# Patient Record
(demographics unavailable — no encounter records)

---

## 2018-07-09 NOTE — CT
CT ABDOMEN AND PELVIS WITH IV AND ORAL CONTRAST:

 

HISTORY:

Abdominal pain.  Abdominal surgery on 05/01/2018.

 

FINDINGS:

Mild scarring at the lung bases.  Small, hyperdense stones within the gallbladder lumen.

 

Postoperative changes of the anterior abdominal wall.  A band of fibrous tissue and a very small amou
nt of fluid extends obliquely, across the anterior aspect of the right upper quadrant.  The resultant
 pouch in the right abdomen contains a small amount of nonobstructed colon, primarily the hepatic fle
xure.  At the left anterior abdominal wall, fluid within the wall and fascial planes is sheath-like a
nd measures up to 1.6 cm in depth.  There is no abnormal enhancing rim.  No gas is apparent within th
e fluid.

 

The appendix is not inflamed.  No evidence of bowel obstruction.  There is severe S-shaped rotatory s
coliotic curvature of the spine.  The urinary bladder is incompletely distended.

 

IMPRESSION:

1.  Postoperative changes of the abdomen, including the anterior abdominal wall.  A small amount of n
oncomplicated fluid remains within the anterior wall.  It does not appear to be infected, based on CT
 characteristics.  Part of the postoperative changes also include a band if fibrous tissue and minima
l fluid across the anterior aspect of the right upper quadrant, resulting in some nonobstructed colon
 within a pouch in the right upper quadrant.  If there is point tenderness to the right upper quadran
t or if the patient were to develop symptoms of bowel obstruction, please consider surgical re-evalua
tion.

 

2.  Cholelithiasis.

 

POS: [facility]

## 2018-08-07 NOTE — MMO
BILATERAL MAMMOGRAMS:

 

DATE:  08/07/18 

 

HISTORY:  

Screening mammography. 

 

COMPARISON:  

None. Baseline study. 

 

FINDINGS:

Scattered fibroglandular densities. No dominant mass or suspicious calcifications. 

 

The study was evaluated with the assistance of computer-aided detection. 

 

IMPRESSION: 

 

BIRADS 1:  Negative

 

Suggest routine follow-up.   

 

POS: [facility]

## 2018-08-09 NOTE — CT
CT CHEST NONCONTRAST:

LOW DOSE PULMONARY LUNG SCAN:

 

HISTORY:

Tobacco abuse.  Dyspnea.

 

FINDINGS:

The lungs are hyperinflated.  No solid or calcified nodules.  No pleural fluid or pneumothorax.  Lesa
re rightward convex rotatory scoliotic curvature of the thoracic spine.

 

Lack of IV contrast limits evaluation of the soft tissues.  Normal branching of the great vessels fro
m the aortic arch.  Arterial calcification.

 

IMPRESSION:

1.  Lung-RADS category 1-Negative.  Suggest routine followup.

 

2.  Chronic obstructive pulmonary disease.

 

3.  Atherosclerosis.

 

4.  Severe rightward convex curvature of the thoracic spine.

 

POS: [facility]

## 2018-11-28 NOTE — CT
CT ABDOMEN AND PELVIS WITH IV CONTRAST:

 

Technique: Multiple axial tomograms were obtained through the abdomen and pelvis with IV enhancement.
 Oral contrast was given. 

 

Indications: Right abdominal pain. Constipation. 

 

Comparison: CT abdomen and pelvis, 7-9-18

 

FINDINGS: 

The lungs bases are clear. Liver, spleen, and pancreas are unremarkable. Stomach and duodenum are unr
emarkable. 

 

There are bilateral adrenal nodules. There are surgical clips adjacent to the right adrenal gland. Th
e left adrenal nodule measures up to 1.5 cm. Right adrenal nodule measures 1.8 cm. The adrenal glands
 are stable in appearance. 

 

Kidneys unremarkable. 

 

There is an abdominal hernia involving the anterior lateral right abdominal wall with defect measured
 at approximately 11 cm on the axial plane. Colon herniates through this defect and has a similar marge
earance to the prior exam. There is a large amount of stool in the right colon and transverse colon. 
There is diverticulosis of the sigmoid colon. The appendix is unremarkable. Aorta is normal caliber. 
No adenopathy. 

 

Scoliotic curvature of the thoracic lumbar spine is again noted with degenerative spine changes. 

 

IMPRESSION: 

1. Bilateral adrenal nodules appear stable. 

2. Abdominal wall hernia involving the anterior lateral right abdominal wall with colon herniated thr
ough the abdominal wall defect, stable in appearance. 

3. Large amount of stool in the right colon and transverse colon, consistent with the history of cons
tipation. 

4. No evidence of significant interval change. 

 

POS: [facility]

## 2019-06-28 NOTE — MRI
MRI LUMBAR SPINE WITHOUT CONTRAST:

 

HISTORY: 

Low back pain.  Hernia 6 months ago.

 

COMPARISON: 

None.

 

FINDINGS: 

There is leftward curvature of the lumbar spine with apex at the L2 level.  There is appropriate T1 m
arrow signal intensity of the lumbar vertebrae.  Lumbar spine vertebral body height is maintained.  T
here is no fracture.  No significant STIR hyperintensity to suggest vertebral body edema or ligamento
us injury.

 

There is appropriate signal intensity of the paraspinal muscles.  No retroperitoneal mass, lymphadeno
loc, or hematoma.  Conus medullaris terminates at the mid L1 level.

 

T12-L1:  No significant central canal stenosis.  Right neural foramen is patent.  Moderate left jarad
inal narrowing.

 

L1-L2:  Adequate disk hydration.  No significant central canal stenosis or neural foraminal narrowing
.

 

L2-L3:  Adequate disk hydration.  Minimal loss of disk space height.  Generalized disk bulge, ligamen
remy flavum thickening, and right greater than left facet hypertrophy are noted.  Mild central canal s
tenosis.  Right neural foramen and left neural foramen are patent.

 

L3-L4:  Mild loss of disk space height.  There is a central/right subarticular disk bulge.  Disk mate
rial abuts but does not obscure the traversing right L4 nerve root.  Overall, there is only mild sten
osis of the thecal sac.  Mild right foraminal narrowing due to disk material and facet hypertrophy.  
The left neural foramen is patent.

 

L4-L5:  Desiccation with mild to moderate loss of disk space height.  Generalized disk bulge, ligamen
remy flavum thickening, and facet hypertrophy result in mild central canal stenosis.  There is mild na
rrowing of the right subarticular zone.  No significant obscuration of traversing right L5 nerve root
.  Right neural foramen is mildly narrowed.  The left foramen is patent.

 

L5-S1:  Mild loss of disk space height along the posterior aspect of the disk space.  There is associ
ated posterior disk protrusion.  Mild central canal stenosis.  There is bilateral facet hypertrophy. 
 Moderate right and mild to moderate left foraminal narrowing.

 

IMPRESSION: 

1.  Degenerative changes of the lumbar spine as above.

 

2.  Levoscoliosis of the lumbar spine.

 

POS: [facility]

## 2019-08-02 NOTE — CT
CT of abdomen and pelvis:

8/2/2019



COMPARISON: 11/28/2018



HISTORY: Abdominal bloating,. Umbilical pain.



TECHNIQUE: Axial CT imaging at 5 mm intervals from lung bases through pubic symphysis with IV and ora
l contrast. Coronal reformatted imaging obtained.



FINDINGS: 

Stable prominent scoliotic curvature of the spine noted. Imaged lung bases are unremarkable.



No free intraperitoneal air or fluid.



The spleen and pancreas appear grossly unremarkable. Punctate density in the region of the gallbladde
r suggest cholelithiasis.



Stable bilateral hypodense adrenal lesions are noted, measuring 1.7 cm in transverse dimension on the
 left, and 1.9 cm in AP dimension on the right.



Bilateral kidneys are unremarkable.



The uterus appears surgically absent.



There is diverticulosis of the sigmoid colon with no evidence for diverticulitis.



There is no evidence for bowel inflammatory change or bowel obstruction. The appendix appears within 
normal limits.



There is mild scattered atherosclerotic calcification of the abdominal aorta.



No abdominal or pelvic lymphadenopathy is appreciated.



No acute osseous abnormality.



IMPRESSION: 

Stable CT examination of the abdomen/pelvis as detailed above. No acute findings are noted.



Transcribed Date/Time: 8/2/2019 10:58 AM



Reported By: Dale Daly 

Electronically Signed:  8/2/2019 1:19 PM